# Patient Record
Sex: MALE | Race: BLACK OR AFRICAN AMERICAN | URBAN - METROPOLITAN AREA
[De-identification: names, ages, dates, MRNs, and addresses within clinical notes are randomized per-mention and may not be internally consistent; named-entity substitution may affect disease eponyms.]

---

## 2018-01-15 ENCOUNTER — ED HISTORICAL/CONVERTED ENCOUNTER (OUTPATIENT)
Dept: OTHER | Age: 23
End: 2018-01-15

## 2018-02-22 ENCOUNTER — ED HISTORICAL/CONVERTED ENCOUNTER (OUTPATIENT)
Dept: OTHER | Age: 23
End: 2018-02-22

## 2018-11-27 ENCOUNTER — ED HISTORICAL/CONVERTED ENCOUNTER (OUTPATIENT)
Dept: OTHER | Age: 23
End: 2018-11-27

## 2020-07-05 ENCOUNTER — ED HISTORICAL/CONVERTED ENCOUNTER (OUTPATIENT)
Dept: OTHER | Age: 25
End: 2020-07-05

## 2022-05-05 ENCOUNTER — HOSPITAL ENCOUNTER (EMERGENCY)
Facility: MEDICAL CENTER | Age: 27
End: 2022-05-05

## 2022-05-05 VITALS
TEMPERATURE: 99.3 F | RESPIRATION RATE: 16 BRPM | HEART RATE: 103 BPM | HEIGHT: 73 IN | SYSTOLIC BLOOD PRESSURE: 119 MMHG | DIASTOLIC BLOOD PRESSURE: 74 MMHG | BODY MASS INDEX: 21.15 KG/M2 | OXYGEN SATURATION: 99 % | WEIGHT: 159.61 LBS

## 2022-05-05 PROCEDURE — 302449 STATCHG TRIAGE ONLY (STATISTIC)

## 2022-05-05 NOTE — ED TRIAGE NOTES
Pt ambulated to triage with   Chief Complaint   Patient presents with   • Fever     101 and 101.5 at home since yesterday.    • Shortness of Breath   • Diarrhea     Started yesterday, no blood in stool.  Pt reports some types of symptoms a wk ago and resolved on its own.  Pt reports symptoms only happen when pt goes to work.    • Back Pain       Pt Informed regarding triage process and verbalized understanding to inform triage tech or RN for any changes in condition. Placed in lobby.

## 2022-07-15 ENCOUNTER — OCCUPATIONAL MEDICINE (OUTPATIENT)
Dept: URGENT CARE | Facility: PHYSICIAN GROUP | Age: 27
End: 2022-07-15
Payer: COMMERCIAL

## 2022-07-15 VITALS
WEIGHT: 160 LBS | HEIGHT: 72 IN | SYSTOLIC BLOOD PRESSURE: 124 MMHG | RESPIRATION RATE: 14 BRPM | DIASTOLIC BLOOD PRESSURE: 80 MMHG | HEART RATE: 69 BPM | TEMPERATURE: 97.8 F | OXYGEN SATURATION: 100 % | BODY MASS INDEX: 21.67 KG/M2

## 2022-07-15 DIAGNOSIS — S39.012A BACK STRAIN, INITIAL ENCOUNTER: ICD-10-CM

## 2022-07-15 PROCEDURE — 99202 OFFICE O/P NEW SF 15 MIN: CPT | Performed by: FAMILY MEDICINE

## 2022-07-15 RX ORDER — DICLOFENAC SODIUM 75 MG/1
75 TABLET, DELAYED RELEASE ORAL EVERY MORNING
Qty: 7 TABLET | Refills: 0 | Status: SHIPPED | OUTPATIENT
Start: 2022-07-15

## 2022-07-15 RX ORDER — CYCLOBENZAPRINE HCL 5 MG
5-10 TABLET ORAL 3 TIMES DAILY PRN
Qty: 30 TABLET | Refills: 0 | Status: SHIPPED | OUTPATIENT
Start: 2022-07-15

## 2022-07-15 ASSESSMENT — ENCOUNTER SYMPTOMS
BACK PAIN: 1
MYALGIAS: 1

## 2022-07-15 NOTE — LETTER
EMPLOYEE’S CLAIM FOR COMPENSATION/ REPORT OF INITIAL TREATMENT  FORM C-4    EMPLOYEE’S CLAIM - PROVIDE ALL INFORMATION REQUESTED   First Name  Genaro Last Name  Ivory Birthdate                    1995                Sex  male Claim Number (Insurer’s Use Only)    Home Address  [address] Age  27 y.o. 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 (if applicable)  moving boxes onto the line    How did this injury or occupational disease occur? (Be specific an answer in detail. Use additional sheet if necessary)  I was moving some heavier boxes onto the line as i was stacking i felt this sharp pain rush up my back and it just got stiff and hot   If you believe that you have an occupational disease, when did you first have knowledge of the disability and it relationship to your employment?  N/A Witnesses to the Accident  N/A      Nature of Injury or Occupational Disease  Workers' Compensation  Part(s) of Body Injured or Affected  Lower Back Area (Lumbar Area & Lumbo-Sacral), Upper Back Area (Thoracic Area), N/A    I  certify that the above is true and correct to the best of my knowledge and that I have provided this information in order to obtain the benefits of [place]  I hereby authorize any physician, chiropractor, surgeon, practitioner, or other person, any hospital, including [facility] or [facility], any medical service organization, any insurance company, or other institution or organization to release to each other, any medical or other information, including benefits paid or payable, pertinent to this injury or disease, except information relative to diagnosis, treatment and/or counseling for AIDS, psychological conditions, alcohol or controlled substances, for which I must give specific authorization.  A Photostat of this authorization shall be as valid as the original.     Date   Place Employee’s Original or  *Electronic Signature   THIS REPORT MUST BE COMPLETED AND MAILED WITHIN 3 WORKING DAYS OF TREATMENT   Place  [facility] [place]  Name of Facility  [facility]   Date  7/15/2022 Diagnosis and Description of Injury or Occupational Disease  (S39.012A) Back strain, initial encounter Is there evidence the injured employee was under the influence of alcohol and/or another controlled substance at the time of accident?  ? No ? Yes (if yes, please explain)    Hour  12:54 PM   The encounter diagnosis was Back strain, initial encounter. No   Treatment  Work restrictions, rx meds  Have you advised the patient to remain off work five days or     more?    X-Ray Findings      ? Yes Indicate dates:   From   To      From information given by the employee, together with medical evidence, can        you directly connect this injury or occupational disease as job incurred?  Yes ? No If no, is the injured employee capable of:  ? full duty  No ? modified duty  Yes   Is additional medical care by a  "physician indicated?  Yes If Modified Duty, Specify any Limitations / Restrictions  No lifting, pulling, pushing more than 15 lbs. No back bending, running, climbing, squating   Do you know of any previous injury or disease contributing to this condition or occupational disease?  ? Yes ? No (Explain if yes)                          No   Date  7/15/2022 Print Health Care Provider's   Miguel Anderson M.D. I certify the employer’s copy of  this form was mailed on:   Address  [address]  [facility] Insurer’s Use Only     [facility]  [address]    Provider’s Tax ID Number  [id number] Telephone  Dept: [phone]             Health Care Provider’s Original or Electronic Signature  e-SignMIGUEL ANDERSON M.D. Degree (MD,DO, DC,PA-C,APRN)   MD      * Complete and attach Release of Information (Form C-4A) when injured employee signs C-4 Form electronically  ORIGINAL - TREATING HEALTHCARE PROVIDER PAGE 2 - INSURER/TPA PAGE 3 - EMPLOYER PAGE 4 - EMPLOYEE             Form C-4 (rev.08/21)           BRIEF DESCRIPTION OF RIGHTS AND BENEFITS  (Pursuant to [place] 616C.050)    Notice of Injury or Occupational Disease (Incident Report Form C-1): If an injury or occupational disease (OD) arises out of and in the course of employment, you must provide written notice to your employer as soon as practicable, but no later than 7 days after the accident or OD. Your employer shall maintain a sufficient supply of the required forms.    Claim for Compensation (Form C-4): If medical treatment is sought, the form C-4 is available at the place of initial treatment. A completed \"Claim for Compensation\" (Form C-4) must be filed within 90 days after an accident or OD. The treating physician or chiropractor must, within 3 working days after treatment, complete and mail to the employer, the employer's insurer and third-party , the Claim for Compensation.    Medical Treatment: If you require medical treatment for your " on-the-job injury or OD, you may be required to select a physician or chiropractor from a list provided by your workers’ compensation insurer, if it has contracted with an Organization for Managed Care (MCO) or Preferred Provider Organization (PPO) or providers of health care. If your employer has not entered into a contract with an MCO or PPO, you may select a physician or chiropractor from the Panel of Physicians and Chiropractors. Any medical costs related to your industrial injury or OD will be paid by your insurer.    Temporary Total Disability (TTD): If your doctor has certified that you are unable to work for a period of at least 5 consecutive days, or 5 cumulative days in a 20-day period, or places restrictions on you that your employer does not accommodate, you may be entitled to TTD compensation.    Temporary Partial Disability (TPD): If the wage you receive upon reemployment is less than the compensation for TTD to which you are entitled, the insurer may be required to pay you TPD compensation to make up the difference. TPD can only be paid for a maximum of 24 months.    Permanent Partial Disability (PPD): When your medical condition is stable and there is an indication of a PPD as a result of your injury or OD, within 30 days, your insurer must arrange for an evaluation by a rating physician or chiropractor to determine the degree of your PPD. The amount of your PPD award depends on the date of injury, the results of the PPD evaluation, your age and wage.    Permanent Total Disability (PTD): If you are medically certified by a treating physician or chiropractor as permanently and totally disabled and have been granted a PTD status by your insurer, you are entitled to receive monthly benefits not to exceed 66 2/3% of your average monthly wage. The amount of your PTD payments is subject to reduction if you previously received a lump-sum PPD award.    Vocational Rehabilitation Services: You may be eligible  for vocational rehabilitation services if you are unable to return to the job due to a permanent physical impairment or permanent restrictions as a result of your injury or occupational disease.    Transportation and Per Fabian Reimbursement: You may be eligible for travel expenses and per fabian associated with medical treatment.    Reopening: You may be able to reopen your claim if your condition worsens after claim closure.     Appeal Process: If you disagree with a written determination issued by the insurer or the insurer does not respond to your request, you may appeal to the Department of Administration, , by following the instructions contained in your determination letter. You must appeal the determination within 70 days from the date of the determination letter at [address], [address], [place], [place] [address], or [address], [place], [place] [address]. If you disagree with the  decision, you may appeal to the Department of Administration, . You must file your appeal within 30 days from the date of the  decision letter at [address], [address], [place], [place] [address], or [address], [address], [place], [place] [address]. If you disagree with a decision of an , you may file a petition for judicial review with the District Court. You must do so within 30 days of the Appeal Officer’s decision. You may be represented by an  at your own expense or you may contact the [facility] for possible representation.    [place]  for Injured Workers (NAIW): If you disagree with a  decision, you may request that NAIW represent you without charge at an  Hearing. For information regarding denial of benefits, you may contact the [facility] at: [address], [address], [place] [address], [phone], or [address], [place] [address],  [phone]    To File a Complaint with the Division: If you wish to file a complaint with the  of the Division of Industrial Relations (DIR),  please contact the Workers’ Compensation Section, [address], [address], [place], [place] [address], telephone (250) [phone], or [address], [address], [place], [place] [address], telephone [phone]) [phone].    For assistance with Workers’ Compensation Issues: You may contact the [facility] Office for Consumer Health Assistance, [address], [address], [place], [place] [address], Toll Free 1-855.732.1260, Web site: http://[facility].nv.gov/Programs/LILI E-mail: lili@[facility].nv.[facility]              __________________________________________________________________                                    _________________            Employee Name / Signature                                                                                                                            Date                                                                                                                                                                                                                              D-2 (rev. 10/20)

## 2022-07-15 NOTE — LETTER
[facility] [address] [place] [address]  Phone:  [phone] - Fax:  [phone]   [facility] Progress Report and Disability Certification  Date of Service: 7/15/2022   No Show:  No  Date / Time of Next Visit: 7/21/2022   Claim Information   Patient Name: Genaro Dodson  Claim Number:     Employer: [employer]  Date of Injury: 7/14/2022     Insurer / TPA: Ste[employer]  ID / SSN:     Occupation: Pallet builder  Diagnosis: The encounter diagnosis was Back strain, initial encounter.    Medical Information   Related to Industrial Injury? Yes    Subjective Complaints:  DOI 7/15/22 YUDI: lifting a heavy box overhead and felt lower back pain. No fever, no trauma, no bowel/bladder dysfunction or lower limb weakness/heaviness.    Objective Findings:     Pre-Existing Condition(s):     Assessment:   Initial Visit    Status: Additional Care Required  Permanent Disability:No    Plan:      Diagnostics:      Comments:       Disability Information   Status: Released to Restricted Duty    From:  7/15/2022  Through: 7/21/2022 Restrictions are: Temporary   Physical Restrictions   Sitting:    Standing:    Stooping:    Bending:      Squatting:    Walking:    Climbing:    Pushing:      Pulling:    Other:    Reaching Above Shoulder (L):   Reaching Above Shoulder (R):       Reaching Below Shoulder (L):    Reaching Below Shoulder (R):      Not to exceed Weight Limits   Carrying(hrs):   Weight Limit(lb):   Lifting(hrs):   Weight  Limit(lb):     Comments: No lifting, pulling, pushing more than 15 lbs. No back bending, running, climbing, squating    Repetitive Actions   Hands: i.e. Fine Manipulations from Grasping:     Feet: i.e. Operating Foot Controls:     Driving / Operate Machinery:     Health Care Provider’s Original or Electronic Signature  Miguel Remy M.D. Health Care Provider’s Original or Electronic Signature    Yan Fitch MD         Clinic Name / Location: [facility]  [address] [address] Clinic Phone Number: Dept: [phone]   Appointment Time: 12:35 Pm Visit Start Time: 12:54 PM   Check-In Time:  12:46 Pm Visit Discharge Time:  1:27 PM   Original-Treating Physician or Chiropractor    Page 2-Insurer/TPA    Page 3-Employer    Page 4-Employee

## 2022-07-15 NOTE — PROGRESS NOTES
Subjective     Genaro Fernandez is a 27 y.o. male who presents with Other (Initial WC DOI: 7/5/22 and then reinjured 7/14/22/Back injury while lifting boxes, pain radiates from lower back to upper back, )      DOI 7/15/22 YUDI: lifting a heavy box overhead and felt lower back pain. No fever, no trauma, no bowel/bladder dysfunction or lower limb weakness/heaviness.      HPI    Review of Systems   Musculoskeletal: Positive for back pain and myalgias.              Objective     /80 (BP Location: Right arm, Patient Position: Sitting, BP Cuff Size: Adult)   Pulse 69   Temp 36.6 °C (97.8 °F) (Temporal)   Resp 14   Ht 1.829 m (6')   Wt 72.6 kg (160 lb)   SpO2 100%   BMI 21.70 kg/m²      Physical Exam  Vitals and nursing note reviewed.   Constitutional:       General: He is not in acute distress.     Appearance: Normal appearance. He is well-developed.   HENT:      Head: Normocephalic.   Cardiovascular:      Heart sounds: Normal heart sounds. No murmur heard.  Pulmonary:      Effort: Pulmonary effort is normal. No respiratory distress.   Musculoskeletal:        Back:       Comments: No wounds  No gross deformity  No discoloration or rash  Bilateral lower extremity strength intact.  +2 patella reflex  Negative straight leg raise.   Some pain to palp/rom    Neurological:      Mental Status: He is alert.      Motor: No abnormal muscle tone.   Psychiatric:         Mood and Affect: Mood normal.         Behavior: Behavior normal.           Assessment & Plan       1. Back strain, initial encounter  diclofenac DR (VOLTAREN) 75 MG Tablet Delayed Response    cyclobenzaprine (FLEXERIL) 5 mg tablet       No lifting, pulling, pushing more than 15 lbs. No back bending, running, climbing, squatting    1 wk recheck, sooner if needed, ER if worse

## 2022-07-18 ENCOUNTER — HOSPITAL ENCOUNTER (EMERGENCY)
Facility: MEDICAL CENTER | Age: 27
End: 2022-07-18
Attending: EMERGENCY MEDICINE
Payer: OTHER MISCELLANEOUS

## 2022-07-18 ENCOUNTER — HOSPITAL ENCOUNTER (OUTPATIENT)
Dept: RADIOLOGY | Facility: MEDICAL CENTER | Age: 27
End: 2022-07-18
Payer: COMMERCIAL

## 2022-07-18 VITALS
DIASTOLIC BLOOD PRESSURE: 77 MMHG | SYSTOLIC BLOOD PRESSURE: 115 MMHG | HEART RATE: 70 BPM | TEMPERATURE: 98.6 F | WEIGHT: 163.14 LBS | HEIGHT: 72 IN | RESPIRATION RATE: 16 BRPM | OXYGEN SATURATION: 99 % | BODY MASS INDEX: 22.1 KG/M2
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
## 2022-07-18 DIAGNOSIS — S73.101D HIP SPRAIN, RIGHT, SUBSEQUENT ENCOUNTER: ICD-10-CM

## 2022-07-18 DIAGNOSIS — S93.401A SPRAIN OF RIGHT ANKLE, UNSPECIFIED LIGAMENT, INITIAL ENCOUNTER: ICD-10-CM

## 2022-07-18 PROCEDURE — 96372 THER/PROPH/DIAG INJ SC/IM: CPT

## 2022-07-18 PROCEDURE — 700111 HCHG RX REV CODE 636 W/ 250 OVERRIDE (IP): Performed by: EMERGENCY MEDICINE

## 2022-07-18 PROCEDURE — 99284 EMERGENCY DEPT VISIT MOD MDM: CPT

## 2022-07-18 RX ORDER — KETOROLAC TROMETHAMINE 30 MG/ML
60 INJECTION, SOLUTION INTRAMUSCULAR; INTRAVENOUS ONCE
Status: COMPLETED | OUTPATIENT
Start: 2022-07-18 | End: 2022-07-18

## 2022-07-18 RX ADMIN — KETOROLAC TROMETHAMINE 60 MG: 30 INJECTION, SOLUTION INTRAMUSCULAR; INTRAVENOUS at 11:44

## 2022-07-18 ASSESSMENT — PAIN DESCRIPTION - PAIN TYPE: TYPE: ACUTE PAIN

## 2022-07-18 NOTE — ED NOTES
Patient seen at bedside; they have no new complaints at this time and vital signs are stable. Patient given discharge paperwork and given opportunity to ask questions. Patient verbalized understanding of discharge instructions and return precautions. Patient ambulated from ED with steady gait

## 2022-07-18 NOTE — DISCHARGE INSTRUCTIONS
Continue anti-inflammatory medication, muscle relaxer as needed.  See a doctor for recheck in 1 week if no improvement.
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
## 2022-07-18 NOTE — Clinical Note
Genaro Dodson was seen and treated in our emergency department on 7/18/2022.  He may return to work on 07/23/2022.       If you have any questions or concerns, please don't hesitate to call.      Donavan Sherwood M.D.

## 2022-07-23 ENCOUNTER — HOSPITAL ENCOUNTER (EMERGENCY)
Facility: MEDICAL CENTER | Age: 27
End: 2022-07-23
Attending: EMERGENCY MEDICINE

## 2022-07-23 ENCOUNTER — APPOINTMENT (OUTPATIENT)
Dept: RADIOLOGY | Facility: MEDICAL CENTER | Age: 27
End: 2022-07-23
Attending: EMERGENCY MEDICINE

## 2022-07-23 VITALS
BODY MASS INDEX: 22.34 KG/M2 | WEIGHT: 164.9 LBS | TEMPERATURE: 98.3 F | DIASTOLIC BLOOD PRESSURE: 68 MMHG | HEART RATE: 99 BPM | OXYGEN SATURATION: 99 % | HEIGHT: 72 IN | RESPIRATION RATE: 16 BRPM | SYSTOLIC BLOOD PRESSURE: 117 MMHG

## 2022-07-23 DIAGNOSIS — M79.671 RIGHT FOOT PAIN: ICD-10-CM

## 2022-07-23 PROCEDURE — 99283 EMERGENCY DEPT VISIT LOW MDM: CPT

## 2022-07-23 PROCEDURE — 73620 X-RAY EXAM OF FOOT: CPT | Mod: RT

## 2022-07-23 ASSESSMENT — PAIN DESCRIPTION - PAIN TYPE: TYPE: ACUTE PAIN
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
## 2024-08-26 ENCOUNTER — NON-PROVIDER VISIT (OUTPATIENT)
Dept: OCCUPATIONAL MEDICINE | Facility: CLINIC | Age: 29
End: 2024-08-26

## 2024-08-26 DIAGNOSIS — Z02.1 PRE-EMPLOYMENT DRUG SCREENING: ICD-10-CM

## 2024-08-26 LAB
AMP AMPHETAMINE: NORMAL
COC COCAINE: NORMAL
INT CON NEG: NORMAL
INT CON POS: NORMAL
MET METHAMPHETAMINES: NORMAL
OPI OPIATES: NORMAL
PCP PHENCYCLIDINE: NORMAL
POC DRUG COMMENT 753798-OCCUPATIONAL HEALTH: NORMAL
THC: NORMAL

## 2024-08-26 PROCEDURE — 80305 DRUG TEST PRSMV DIR OPT OBS: CPT | Performed by: NURSE PRACTITIONER

## 2024-12-10 PROCEDURE — 99283 EMERGENCY DEPT VISIT LOW MDM: CPT

## 2024-12-11 ENCOUNTER — PHARMACY VISIT (OUTPATIENT)
Dept: PHARMACY | Facility: MEDICAL CENTER | Age: 29
End: 2024-12-11
Payer: COMMERCIAL

## 2024-12-11 ENCOUNTER — HOSPITAL ENCOUNTER (EMERGENCY)
Facility: MEDICAL CENTER | Age: 29
End: 2024-12-11
Attending: STUDENT IN AN ORGANIZED HEALTH CARE EDUCATION/TRAINING PROGRAM

## 2024-12-11 VITALS
TEMPERATURE: 97.7 F | OXYGEN SATURATION: 100 % | BODY MASS INDEX: 22.57 KG/M2 | HEIGHT: 72 IN | RESPIRATION RATE: 16 BRPM | SYSTOLIC BLOOD PRESSURE: 125 MMHG | HEART RATE: 62 BPM | WEIGHT: 166.67 LBS | DIASTOLIC BLOOD PRESSURE: 87 MMHG

## 2024-12-11 DIAGNOSIS — R59.0 CERVICAL LYMPHADENOPATHY: ICD-10-CM

## 2024-12-11 DIAGNOSIS — H66.91 ACUTE OTITIS MEDIA, RIGHT: ICD-10-CM

## 2024-12-11 PROCEDURE — RXMED WILLOW AMBULATORY MEDICATION CHARGE: Performed by: STUDENT IN AN ORGANIZED HEALTH CARE EDUCATION/TRAINING PROGRAM

## 2024-12-11 PROCEDURE — 700102 HCHG RX REV CODE 250 W/ 637 OVERRIDE(OP): Performed by: STUDENT IN AN ORGANIZED HEALTH CARE EDUCATION/TRAINING PROGRAM

## 2024-12-11 PROCEDURE — A9270 NON-COVERED ITEM OR SERVICE: HCPCS | Performed by: STUDENT IN AN ORGANIZED HEALTH CARE EDUCATION/TRAINING PROGRAM

## 2024-12-11 RX ORDER — AMOXICILLIN 500 MG/1
500 CAPSULE ORAL 3 TIMES DAILY
Qty: 15 CAPSULE | Refills: 0 | Status: ACTIVE | OUTPATIENT
Start: 2024-12-11 | End: 2024-12-16

## 2024-12-11 RX ORDER — ACETAMINOPHEN 500 MG
1000 TABLET ORAL ONCE
Status: COMPLETED | OUTPATIENT
Start: 2024-12-11 | End: 2024-12-11

## 2024-12-11 RX ORDER — IBUPROFEN 600 MG/1
600 TABLET, FILM COATED ORAL ONCE
Status: COMPLETED | OUTPATIENT
Start: 2024-12-11 | End: 2024-12-11

## 2024-12-11 RX ADMIN — IBUPROFEN 600 MG: 600 TABLET, FILM COATED ORAL at 01:29

## 2024-12-11 RX ADMIN — ACETAMINOPHEN 1000 MG: 500 TABLET ORAL at 01:29

## 2024-12-11 ASSESSMENT — PAIN DESCRIPTION - PAIN TYPE: TYPE: ACUTE PAIN

## 2024-12-11 NOTE — ED TRIAGE NOTES
Chief Complaint   Patient presents with    Neck Pain     Pt reports he notice lump to the right side of his neck on Saturday. Pt reports sore throat and pain with swallowing. Denies SOB       28 yo M to triage for above complaint.      Pt placed in lobby. Pt educated on triage process. Pt encouraged to alert staff for any changes.     Patient and staff wearing appropriate PPE    /86   Pulse 85   Temp 36.6 °C (97.9 °F) (Oral)   Resp 18   Ht 1.829 m (6')   Wt 75.6 kg (166 lb 10.7 oz)   SpO2 97%   BMI 22.60 kg/m²

## 2024-12-11 NOTE — ED PROVIDER NOTES
ED Provider Note    CHIEF COMPLAINT  Chief Complaint   Patient presents with    Neck Pain     Pt reports he notice lump to the right side of his neck on Saturday. Pt reports sore throat and pain with swallowing. Denies SOB       EXTERNAL RECORDS REVIEWED  Other no recent contributory medical history    HPI/ROS  LIMITATION TO HISTORY   Select: : None      Genaro Dodson is a 29 y.o. male who presents to the emergency department for evaluation of neck and ear pain.  The patient reports noticing scattered painful lumps chiefly to the right side of the neck and some beneath the chin starting on Sunday.  He reports that these have become increasingly painful and large.  He reports some mild pain with swallowing but he is able to swallow and otherwise denies a sore throat.  He states it feels like the right side of his neck is somewhat swollen.  He also reports gradual onset of now moderate severity pain to his right ear today.  He reports fevers up to 101 degrees at home starting Sunday.  He reports a runny nose over the last week.  He denies cough or trouble breathing, nausea vomiting or diarrhea, abdominal pain, headache.  He reports he is otherwise healthy.    PAST MEDICAL HISTORY   Otherwise healthy    SURGICAL HISTORY  patient denies any surgical history    FAMILY HISTORY  No family history on file.    SOCIAL HISTORY  Social History     Tobacco Use    Smoking status: Every Day     Types: Cigarettes    Smokeless tobacco: Never   Vaping Use    Vaping status: Every Day    Substances: THC   Substance and Sexual Activity    Alcohol use: Yes     Comment: socially    Drug use: Yes     Types: Inhaled, Marijuana    Sexual activity: Not on file       CURRENT MEDICATIONS  Home Medications       Reviewed by Stephanie Smart R.N. (Registered Nurse) on 12/11/24 at [facility]  Med List Status: Not Addressed     Medication Last Dose Status   cyclobenzaprine (FLEXERIL) 5 mg tablet  Active   diclofenac DR (VOLTAREN) 75 MG Tablet  Delayed Response  Active                    ALLERGIES  Allergies   Allergen Reactions    Pollen Extract      Dust and pollen         PHYSICAL EXAM  VITAL SIGNS: /86   Pulse 85   Temp 36.6 °C (97.9 °F) (Oral)   Resp 18   Ht 1.829 m (6')   Wt 75.6 kg (166 lb 10.7 oz)   SpO2 97%   BMI 22.60 kg/m²    Constitutional: No acute distress.  HENT: Normocephalic, Atraumatic, Bilateral external ears normal.  Right tympanic membrane is erythematous and bulging.  Left tympanic membrane unremarkable.  Mastoids nontender, nonedematous bilaterally.  Neck: Scattered anterior and posterior cervical lymphadenopathy most pronounced on the right as well as submandibular lymphadenopathy.  Lymph nodes are small, approximately 1 cm in diameter at largest.  Otherwise full range of motion of the neck, no meningismus.  Eyes: Pupils are equal and reactive. Conjunctiva normal, non-icteric.   Heart: Regular rate and rythm,   Lungs: No respiratory distress  GI: Normal assessment  Musculoskeletal: No obvious deformity. No leg edema.  Skin: Warm, Dry, No erythema, No rash.   Neurologic: Alert and oriented x 3, Cranial nerves III through XII grossly intact no sensory deficit no cerebellar dysfunction.   Psychiatric: Appropriate affect for situation      COURSE & MEDICAL DECISION MAKING    ASSESSMENT, COURSE AND PLAN  Care Narrative:     Patient presents to the emergency department for evaluation of ear pain and neck pain.  He is afebrile with stable vital signs and clinically well-appearing.  His examination demonstrates shotty cervical lymphadenopathy associated with what appears to be right-sided acute otitis media.  There is no evidence currently of mastoiditis, meningitis or intracranial infection.  No evidence of pharyngitis, peritonsillar abscess, retropharyngeal abscess, epiglottitis.  Doubt suppurative thrombophlebitis or additional serious head or neck infection.  At this point given his clinical well appearance, baseline good  health status and absence of risk factors for complicated illness I do not feel any further laboratory imaging workup is necessary at this point.  Will treat with oral antibiotics for acute otitis media.  Recommended he establish care with a primary care doctor for reassessment however in 1 to 2 weeks to ensure lymphadenopathy is improving.  Information to do so provided.  He is comfortable with this plan.  He was treated with acetaminophen and ibuprofen for pain.  Return precautions discussed all questions answered he was discharged in stable condition.      ADDITIONAL PROBLEMS MANAGED  None    DISPOSITION AND DISCUSSIONS    Escalation of care considered, and ultimately not performed:Laboratory analysis and diagnostic imaging    Barriers to care at this time, including but not limited to: Patient does not have established PCP.     Decision tools and prescription drugs considered including, but not limited to: Antibiotics amoxicillin and Pain Medications acetaminophen and ibuprofen .    FINAL DIAGNOSIS  1. Cervical lymphadenopathy    2. Acute otitis media, right         Electronically signed by: Mayank Schroeder M.D., 12/11/2024 1:05 AM

## 2024-12-11 NOTE — DISCHARGE INSTRUCTIONS
Take your antibiotics as prescribed and call the attached number to schedule a follow up appointment with a PCP in 1-2 weeks.     Return to the ER with worsening swelling, severe headache, inability to swallow or trouble breathing.